# Patient Record
Sex: MALE | Race: WHITE | NOT HISPANIC OR LATINO | Employment: FULL TIME | URBAN - NONMETROPOLITAN AREA
[De-identification: names, ages, dates, MRNs, and addresses within clinical notes are randomized per-mention and may not be internally consistent; named-entity substitution may affect disease eponyms.]

---

## 2025-01-21 ENCOUNTER — OFFICE VISIT (OUTPATIENT)
Dept: PRIMARY CARE | Facility: CLINIC | Age: 30
End: 2025-01-21
Payer: COMMERCIAL

## 2025-01-21 VITALS
BODY MASS INDEX: 23.8 KG/M2 | OXYGEN SATURATION: 97 % | HEART RATE: 82 BPM | DIASTOLIC BLOOD PRESSURE: 60 MMHG | SYSTOLIC BLOOD PRESSURE: 112 MMHG | WEIGHT: 170 LBS | HEIGHT: 71 IN | TEMPERATURE: 98.9 F

## 2025-01-21 DIAGNOSIS — R50.9 COUGH WITH FEVER: Primary | ICD-10-CM

## 2025-01-21 DIAGNOSIS — R05.9 COUGH WITH FEVER: Primary | ICD-10-CM

## 2025-01-21 PROCEDURE — 87636 SARSCOV2 & INF A&B AMP PRB: CPT

## 2025-01-21 PROCEDURE — 1036F TOBACCO NON-USER: CPT

## 2025-01-21 PROCEDURE — 3008F BODY MASS INDEX DOCD: CPT

## 2025-01-21 PROCEDURE — 99213 OFFICE O/P EST LOW 20 MIN: CPT

## 2025-01-21 ASSESSMENT — PATIENT HEALTH QUESTIONNAIRE - PHQ9
SUM OF ALL RESPONSES TO PHQ9 QUESTIONS 1 & 2: 0
2. FEELING DOWN, DEPRESSED OR HOPELESS: NOT AT ALL
1. LITTLE INTEREST OR PLEASURE IN DOING THINGS: NOT AT ALL

## 2025-01-21 NOTE — PROGRESS NOTES
"Subjective   Patient ID: Veto Covarrubias is a 29 y.o. male who presents for Cough (Has a cough fever body aches for about 3 weeks ).  HPI  - has been coughing for three weeks   - last week started getting body aches  - sometimes felt he lost his taste and smell   - yesterday started with fever-- 100 today. Woke up soaked in sweat.  - cough is dry , sometimes would gag because he coughed so hard  - some diarrhea yesterday  - still w body aches  - no rash  - no cp/sob   - eating and drinking well   - voiding well   - dtr with influenza, was swabbed.  - no significant medical hx     No current outpatient medications on file.   History reviewed. No pertinent surgical history.   Past Medical History:   Diagnosis Date    Personal history of other specified conditions 06/03/2019    History of lymphadenopathy     Social History     Tobacco Use    Smoking status: Never    Smokeless tobacco: Never   Substance Use Topics    Alcohol use: Yes     Alcohol/week: 4.0 standard drinks of alcohol     Types: 4 Cans of beer per week    Drug use: Never      No family history on file.   Review of Systems  10 point ROS negative except as otherwise noted in the HPI.      Objective   /60   Pulse 82   Temp 37.2 °C (98.9 °F)   Ht 1.803 m (5' 11\")   Wt 77.1 kg (170 lb)   SpO2 97%   BMI 23.71 kg/m²    Physical Exam  Constitutional:       General: He is not in acute distress.     Appearance: Normal appearance. He is not ill-appearing or toxic-appearing.   HENT:      Head: Normocephalic and atraumatic.      Right Ear: Tympanic membrane, ear canal and external ear normal.      Left Ear: Tympanic membrane, ear canal and external ear normal.      Nose: Nose normal. No congestion or rhinorrhea.      Mouth/Throat:      Mouth: Mucous membranes are moist.      Pharynx: Oropharynx is clear. No oropharyngeal exudate or posterior oropharyngeal erythema.   Eyes:      Conjunctiva/sclera: Conjunctivae normal.      Pupils: Pupils are equal, round, and " reactive to light.   Neck:      Vascular: No carotid bruit.   Cardiovascular:      Rate and Rhythm: Normal rate and regular rhythm.      Pulses: Normal pulses.      Heart sounds: Normal heart sounds. No murmur heard.  Pulmonary:      Effort: Pulmonary effort is normal.      Breath sounds: Normal breath sounds. No wheezing, rhonchi or rales.      Comments: + cough  Abdominal:      General: Bowel sounds are normal. There is no distension.      Palpations: Abdomen is soft. There is no mass.      Tenderness: There is no abdominal tenderness. There is no guarding or rebound.   Musculoskeletal:         General: Normal range of motion.      Cervical back: Normal range of motion and neck supple. No tenderness.      Right lower leg: No edema.      Left lower leg: No edema.   Lymphadenopathy:      Cervical: No cervical adenopathy.   Skin:     General: Skin is warm and dry.      Findings: No rash.   Neurological:      Mental Status: He is alert and oriented to person, place, and time.   Psychiatric:         Mood and Affect: Mood normal.         Behavior: Behavior normal.           Assessment/Plan   Problem List Items Addressed This Visit    None  Visit Diagnoses       Cough with fever    -  Primary  - Pt had cough for about three weeks. Then more recently in the last few days started with body aches, sweating, fever, diarrhea. Cough is persisting. Dtr tested positive for influenza.   - suspect he likely had a URI a few weeks ago w lingering cough, now with a separate viral illness.  - will swab for flu a/b and covid   - if neg, will discuss treating w abx in the event this was a viral infection that has become secondary bacterial infection, but given some improvement in between and known exposure to flu I am less suspicious for that at this time.     Relevant Orders    Sars-CoV-2 and Influenza A/B PCR            Discussed at visit any disease processes that were of concern as well as the risks, benefits and instructions on any  new medication provided. Patient (and/or caretaker of patient if present) stated all questions were answered, and they voiced understanding of instructions.     Falguni Delaney PA-C

## 2025-01-21 NOTE — PATIENT INSTRUCTIONS
We will check for flu a, flu b, and covid   If negative and still not improving will send antibiotic    TREATMENT FOR SINUSITIS AND UPPER RESPIRATORY INFECTIONS:     Drink plenty of fluids, especially water.     Used humidifiers, steam, hot liquids to moisten your nasal passages.      Saline nasal spray often helps,  Simply Saline is a nice over the counter saline spray that you can use as much as you want.     IF DIRECTED TO DO SO:    You can use Afrin nasal spray for the first 3 days  ONLY , after that, it will make you worse, not better. DO NOT USE IN CHILDREN UNDER 6 YEARS OF AGE OR IF YOU HAVE ANY HYPERTENTION OR HEART ISSUES.      Mucinex or guaifenesin is an over the counter medication that often helps loosen the mucous.  DO NOT USE IN CHILDREN UNDER 6 YEARS OF AGE.      Please be sure to call or follow-up if you are not better in 5-10 days, or if you are worsening.      The most common cause of upper respiratory infections are viruses - which no not need an antibiotic to get better.   We want your own immune system to fight the virus so you or your child develop immunity to it.    However,  people can develop pneumonia, sinus infections and sometimes ear infections from a virus  - which may need an antibiotic.   So if you are showing signs of breathing issues,  or severe ear pain or facial pain with fevers, of if you are no better after 10 days , its important that you contact us.        If you are prescribed an antibiotic,  it's a good idea to take a probiotic to help prevent diarrhea and yeast infections.  This would be eating a yogurt daily or taking something like acidophillus or Culturelle.

## 2025-01-22 ENCOUNTER — OFFICE VISIT (OUTPATIENT)
Dept: PRIMARY CARE | Facility: CLINIC | Age: 30
End: 2025-01-22
Payer: COMMERCIAL

## 2025-01-22 ENCOUNTER — HOSPITAL ENCOUNTER (OUTPATIENT)
Dept: RADIOLOGY | Facility: CLINIC | Age: 30
Discharge: HOME | End: 2025-01-22
Payer: COMMERCIAL

## 2025-01-22 VITALS — OXYGEN SATURATION: 96 % | SYSTOLIC BLOOD PRESSURE: 118 MMHG | DIASTOLIC BLOOD PRESSURE: 68 MMHG | HEART RATE: 92 BPM

## 2025-01-22 DIAGNOSIS — J10.1 INFLUENZA A: Primary | ICD-10-CM

## 2025-01-22 DIAGNOSIS — R05.9 COUGH WITH FEVER: Primary | ICD-10-CM

## 2025-01-22 DIAGNOSIS — J10.1 INFLUENZA A: ICD-10-CM

## 2025-01-22 DIAGNOSIS — R50.9 COUGH WITH FEVER: Primary | ICD-10-CM

## 2025-01-22 LAB
FLUAV RNA RESP QL NAA+PROBE: DETECTED
FLUBV RNA RESP QL NAA+PROBE: NOT DETECTED
SARS-COV-2 RNA RESP QL NAA+PROBE: NOT DETECTED

## 2025-01-22 PROCEDURE — 1036F TOBACCO NON-USER: CPT

## 2025-01-22 PROCEDURE — 71046 X-RAY EXAM CHEST 2 VIEWS: CPT

## 2025-01-22 PROCEDURE — 99213 OFFICE O/P EST LOW 20 MIN: CPT

## 2025-01-22 PROCEDURE — 71046 X-RAY EXAM CHEST 2 VIEWS: CPT | Performed by: RADIOLOGY

## 2025-01-22 RX ORDER — OSELTAMIVIR PHOSPHATE 75 MG/1
75 CAPSULE ORAL 2 TIMES DAILY
Qty: 10 CAPSULE | Refills: 0 | Status: SHIPPED | OUTPATIENT
Start: 2025-01-22 | End: 2025-01-27

## 2025-01-22 RX ORDER — PREDNISONE 20 MG/1
40 TABLET ORAL DAILY
Qty: 10 TABLET | Refills: 0 | Status: SHIPPED | OUTPATIENT
Start: 2025-01-22 | End: 2025-01-27

## 2025-01-22 RX ORDER — PREDNISONE 20 MG/1
40 TABLET ORAL DAILY
Qty: 10 TABLET | Refills: 0 | Status: SHIPPED | OUTPATIENT
Start: 2025-01-22 | End: 2025-01-22

## 2025-01-22 RX ORDER — ALBUTEROL SULFATE 90 UG/1
2 INHALANT RESPIRATORY (INHALATION) EVERY 4 HOURS PRN
Qty: 8 G | Refills: 5 | Status: SHIPPED | OUTPATIENT
Start: 2025-01-22 | End: 2025-01-22

## 2025-01-22 RX ORDER — ALBUTEROL SULFATE 90 UG/1
2 INHALANT RESPIRATORY (INHALATION) EVERY 4 HOURS PRN
Qty: 8 G | Refills: 5 | Status: SHIPPED | OUTPATIENT
Start: 2025-01-22 | End: 2026-01-22

## 2025-01-22 NOTE — PATIENT INSTRUCTIONS
Add prednisone and albuterol inhaler to tamiflu.  If you get worsening shortness of breath, chest pain, dizziness, feeling like you are going to pass out, go to the ER.

## 2025-01-22 NOTE — PROGRESS NOTES
Subjective   Patient ID: Veto Covarrubias is a 29 y.o. male who presents for Shortness of Breath.  HPI  - pt tested positive for flu A yesterday   - started tamiflu  - today called because he is having pain in his sternum with inhalation, feeling short of breath  - still w headache, congestion, hoarseness  - no other CP  - no dizziness  - no personal or family hx of clotting      Current Outpatient Medications:     albuterol (Ventolin HFA) 90 mcg/actuation inhaler, Inhale 2 puffs every 4 hours if needed for wheezing or shortness of breath., Disp: 8 g, Rfl: 5    oseltamivir (Tamiflu) 75 mg capsule, Take 1 capsule (75 mg) by mouth 2 times a day for 5 days., Disp: 10 capsule, Rfl: 0    predniSONE (Deltasone) 20 mg tablet, Take 2 tablets (40 mg) by mouth once daily for 5 days., Disp: 10 tablet, Rfl: 0   History reviewed. No pertinent surgical history.   Past Medical History:   Diagnosis Date    Personal history of other specified conditions 06/03/2019    History of lymphadenopathy     Social History     Tobacco Use    Smoking status: Never    Smokeless tobacco: Never   Substance Use Topics    Alcohol use: Yes     Alcohol/week: 4.0 standard drinks of alcohol     Types: 4 Cans of beer per week    Drug use: Never      No family history on file.   Review of Systems  10 point ROS negative except as otherwise noted in the HPI.      Objective   /68   Pulse 92   SpO2 96%    Physical Exam  Vitals reviewed.   Constitutional:       Appearance: Normal appearance.   HENT:      Head: Normocephalic and atraumatic.   Cardiovascular:      Rate and Rhythm: Normal rate and regular rhythm.      Pulses: Normal pulses.      Heart sounds: Normal heart sounds.   Pulmonary:      Effort: Pulmonary effort is normal.      Breath sounds: Wheezing present. No rales.   Chest:      Chest wall: No tenderness.   Musculoskeletal:         General: Normal range of motion.   Skin:     General: Skin is warm and dry.      Findings: No rash.    Neurological:      General: No focal deficit present.      Mental Status: He is oriented to person, place, and time.   Psychiatric:         Mood and Affect: Mood normal.         Behavior: Behavior normal.           Assessment/Plan   Problem List Items Addressed This Visit    None  Visit Diagnoses       Cough with fever    -  Primary  - Pt with influenza A. Started tamiflu today.   - Pain with inhalation along sternum. Feels a bit short of breath.   - CXR done , looks OK.. will await read  - spo2 normal, not tachycardic.   - a little wheezing on exam, will add prednisone and albuterol   - ER precautions     Relevant Medications    albuterol (Ventolin HFA) 90 mcg/actuation inhaler    predniSONE (Deltasone) 20 mg tablet            Discussed at visit any disease processes that were of concern as well as the risks, benefits and instructions on any new medication provided. Patient (and/or caretaker of patient if present) stated all questions were answered, and they voiced understanding of instructions.     Falguni Delaney PA-C